# Patient Record
Sex: FEMALE | Race: BLACK OR AFRICAN AMERICAN | Employment: UNEMPLOYED | URBAN - METROPOLITAN AREA
[De-identification: names, ages, dates, MRNs, and addresses within clinical notes are randomized per-mention and may not be internally consistent; named-entity substitution may affect disease eponyms.]

---

## 2021-02-07 ENCOUNTER — APPOINTMENT (OUTPATIENT)
Dept: GENERAL RADIOLOGY | Age: 31
End: 2021-02-07
Attending: PHYSICIAN ASSISTANT
Payer: COMMERCIAL

## 2021-02-07 ENCOUNTER — APPOINTMENT (OUTPATIENT)
Dept: CT IMAGING | Age: 31
End: 2021-02-07
Attending: PHYSICIAN ASSISTANT
Payer: COMMERCIAL

## 2021-02-07 ENCOUNTER — HOSPITAL ENCOUNTER (EMERGENCY)
Age: 31
Discharge: HOME | End: 2021-02-08
Attending: EMERGENCY MEDICINE
Payer: COMMERCIAL

## 2021-02-07 VITALS
DIASTOLIC BLOOD PRESSURE: 75 MMHG | WEIGHT: 145 LBS | SYSTOLIC BLOOD PRESSURE: 119 MMHG | OXYGEN SATURATION: 100 % | TEMPERATURE: 98.6 F | HEART RATE: 81 BPM | RESPIRATION RATE: 17 BRPM

## 2021-02-07 DIAGNOSIS — R20.2 PARESTHESIA: Primary | ICD-10-CM

## 2021-02-07 LAB
ALBUMIN SERPL-MCNC: 4.1 G/DL (ref 3.4–5)
ALBUMIN/GLOB SERPL: 1.3 {RATIO} (ref 0.8–1.7)
ALP SERPL-CCNC: 95 U/L (ref 45–117)
ALT SERPL-CCNC: 34 U/L (ref 13–56)
ANION GAP SERPL CALC-SCNC: 5 MMOL/L (ref 3–18)
AST SERPL-CCNC: 18 U/L (ref 10–38)
BILIRUB SERPL-MCNC: 0.2 MG/DL (ref 0.2–1)
BUN SERPL-MCNC: 10 MG/DL (ref 7–18)
BUN/CREAT SERPL: 12 (ref 12–20)
CALCIUM SERPL-MCNC: 8.3 MG/DL (ref 8.5–10.1)
CHLORIDE SERPL-SCNC: 108 MMOL/L (ref 100–111)
CO2 SERPL-SCNC: 28 MMOL/L (ref 21–32)
CREAT SERPL-MCNC: 0.85 MG/DL (ref 0.6–1.3)
GLOBULIN SER CALC-MCNC: 3.1 G/DL (ref 2–4)
GLUCOSE SERPL-MCNC: 108 MG/DL (ref 74–99)
POTASSIUM SERPL-SCNC: 3.9 MMOL/L (ref 3.5–5.5)
PROT SERPL-MCNC: 7.2 G/DL (ref 6.4–8.2)
SODIUM SERPL-SCNC: 141 MMOL/L (ref 136–145)
TROPONIN I SERPL-MCNC: <0.02 NG/ML (ref 0–0.04)

## 2021-02-07 PROCEDURE — 81003 URINALYSIS AUTO W/O SCOPE: CPT

## 2021-02-07 PROCEDURE — 81025 URINE PREGNANCY TEST: CPT

## 2021-02-07 PROCEDURE — 70450 CT HEAD/BRAIN W/O DYE: CPT

## 2021-02-07 PROCEDURE — 85025 COMPLETE CBC W/AUTO DIFF WBC: CPT

## 2021-02-07 PROCEDURE — 99283 EMERGENCY DEPT VISIT LOW MDM: CPT

## 2021-02-07 PROCEDURE — 84484 ASSAY OF TROPONIN QUANT: CPT

## 2021-02-07 PROCEDURE — 71045 X-RAY EXAM CHEST 1 VIEW: CPT

## 2021-02-07 PROCEDURE — 80053 COMPREHEN METABOLIC PANEL: CPT

## 2021-02-08 LAB
APPEARANCE UR: CLEAR
ATRIAL RATE: 67 BPM
BASOPHILS # BLD: 0 K/UL (ref 0–0.1)
BASOPHILS NFR BLD: 0 % (ref 0–2)
BILIRUB UR QL: NEGATIVE
CALCULATED P AXIS, ECG09: 39 DEGREES
CALCULATED R AXIS, ECG10: 66 DEGREES
CALCULATED T AXIS, ECG11: 40 DEGREES
COLOR UR: YELLOW
DIAGNOSIS, 93000: NORMAL
DIFFERENTIAL METHOD BLD: ABNORMAL
EOSINOPHIL # BLD: 0.4 K/UL (ref 0–0.4)
EOSINOPHIL NFR BLD: 4 % (ref 0–5)
ERYTHROCYTE [DISTWIDTH] IN BLOOD BY AUTOMATED COUNT: 12.3 % (ref 11.6–14.5)
GLUCOSE UR STRIP.AUTO-MCNC: NEGATIVE MG/DL
HCG UR QL: NEGATIVE
HCT VFR BLD AUTO: 36.7 % (ref 35–45)
HGB BLD-MCNC: 13.1 G/DL (ref 12–16)
HGB UR QL STRIP: NEGATIVE
KETONES UR QL STRIP.AUTO: ABNORMAL MG/DL
LEUKOCYTE ESTERASE UR QL STRIP.AUTO: NEGATIVE
LYMPHOCYTES # BLD: 4.3 K/UL (ref 0.9–3.6)
LYMPHOCYTES NFR BLD: 47 % (ref 21–52)
MCH RBC QN AUTO: 31.6 PG (ref 24–34)
MCHC RBC AUTO-ENTMCNC: 35.7 G/DL (ref 31–37)
MCV RBC AUTO: 88.4 FL (ref 74–97)
MONOCYTES # BLD: 0.5 K/UL (ref 0.05–1.2)
MONOCYTES NFR BLD: 5 % (ref 3–10)
NEUTS SEG # BLD: 4.2 K/UL (ref 1.8–8)
NEUTS SEG NFR BLD: 44 % (ref 40–73)
NITRITE UR QL STRIP.AUTO: NEGATIVE
P-R INTERVAL, ECG05: 140 MS
PH UR STRIP: 6 [PH] (ref 5–8)
PLATELET # BLD AUTO: 334 K/UL (ref 135–420)
PMV BLD AUTO: 10.1 FL (ref 9.2–11.8)
PROT UR STRIP-MCNC: NEGATIVE MG/DL
Q-T INTERVAL, ECG07: 388 MS
QRS DURATION, ECG06: 68 MS
QTC CALCULATION (BEZET), ECG08: 409 MS
RBC # BLD AUTO: 4.15 M/UL (ref 4.2–5.3)
SP GR UR REFRACTOMETRY: >1.03 (ref 1–1.03)
UROBILINOGEN UR QL STRIP.AUTO: 1 EU/DL (ref 0.2–1)
VENTRICULAR RATE, ECG03: 67 BPM
WBC # BLD AUTO: 9.5 K/UL (ref 4.6–13.2)

## 2021-02-08 PROCEDURE — 93005 ELECTROCARDIOGRAM TRACING: CPT

## 2021-02-08 RX ORDER — PREDNISONE 10 MG/1
TABLET ORAL
Qty: 21 TAB | Refills: 0 | Status: SHIPPED | OUTPATIENT
Start: 2021-02-08

## 2021-02-08 NOTE — ED PROVIDER NOTES
EMERGENCY DEPARTMENT HISTORY AND PHYSICAL EXAM    Date: 2/7/2021  Patient Name: Inessa Bledsoe    History of Presenting Illness     Chief Complaint   Patient presents with    Numbness         History Provided By:patient     Chief Complaint: facial numbness and tingling  Duration: 1 week   Timing:  Acute, intermittent   Location: right side  Quality:pins and needles sensation   Severity: moderate  Modifying Factors: none  Associated Symptoms: migraine with aura      Additional History (Context): Inessa Bledsoe is a 31 y.o. female with PMH migraine headaches with aura who presents with intermittent right sided facial numbness/tingling that started 1 week ago. Pt states the tingling started at the onset of her migraine 1 week ago but reports that after her migraine resolved the tingling did not improved until 5 days later. She states the tingling stopped for 1 day then reoccurred yesterday. Pt denies facial tenderness/pain, dizziness, vision changes, or trouble with speech. She endorses nasal congestion and popping in her right ear this morning. She consulted her PCP via tele-conference this morning and he advised her to address this new symptom in ED. No other complaints reported at this time. PCP: None        Past History     Past Medical History:  No past medical history on file. Past Surgical History:  No past surgical history on file. Family History:  No family history on file. Social History:  Social History     Tobacco Use    Smoking status: Not on file   Substance Use Topics    Alcohol use: Not on file    Drug use: Not on file       Allergies: Allergies   Allergen Reactions    Penicillins Rash         Review of Systems   Review of Systems   Constitutional: Negative. Negative for chills and fever. HENT: Negative. Negative for congestion, ear pain and rhinorrhea. Eyes: Negative. Negative for pain and redness. Respiratory: Negative.   Negative for cough, shortness of breath, wheezing and stridor. Cardiovascular: Negative. Negative for chest pain and leg swelling. Gastrointestinal: Negative. Negative for abdominal pain, constipation, diarrhea, nausea and vomiting. Genitourinary: Negative. Negative for dysuria and frequency. Musculoskeletal: Negative. Negative for back pain and neck pain. Skin: Negative. Negative for rash and wound. Neurological: Positive for numbness and headaches. Negative for dizziness, seizures and syncope. Facial numbness/tingling ; hx of migraine headaches with aura   All other systems reviewed and are negative. All Other Systems Negative  Physical Exam     Vitals:    02/07/21 2235   BP: 119/75   Pulse: 81   Resp: 17   Temp: 98.6 °F (37 °C)   SpO2: 100%   Weight: 65.8 kg (145 lb)     Physical Exam  Vitals signs and nursing note reviewed. Constitutional:       General: She is not in acute distress. Appearance: She is well-developed. She is not diaphoretic. HENT:      Head: Normocephalic and atraumatic. Right Ear: Tympanic membrane, ear canal and external ear normal. There is no impacted cerumen. Left Ear: Tympanic membrane, ear canal and external ear normal. There is no impacted cerumen. Nose: No congestion or rhinorrhea. Comments: Mild erythema to the R sided nasal turbinates, otherwise HEENT exam is unremarkable. Mouth/Throat:      Mouth: Mucous membranes are moist.      Pharynx: No oropharyngeal exudate or posterior oropharyngeal erythema. Eyes:      General: No scleral icterus. Right eye: No discharge. Left eye: No discharge. Extraocular Movements: Extraocular movements intact. Conjunctiva/sclera: Conjunctivae normal.      Pupils: Pupils are equal, round, and reactive to light. Neck:      Musculoskeletal: Normal range of motion and neck supple. No neck rigidity or muscular tenderness. Cardiovascular:      Rate and Rhythm: Normal rate and regular rhythm.       Heart sounds: Normal heart sounds. No murmur. No friction rub. No gallop. Pulmonary:      Effort: Pulmonary effort is normal. No respiratory distress. Breath sounds: Normal breath sounds. No stridor. No wheezing, rhonchi or rales. Musculoskeletal: Normal range of motion. Skin:     General: Skin is warm and dry. Findings: No erythema or rash. Neurological:      Mental Status: She is alert and oriented to person, place, and time. Sensory: Sensory deficit present. Coordination: Coordination normal.      Comments: Gait is steady and patient exhibits no evidence of ataxia. Patient is able to ambulate without difficulty. No focal neurological deficit noted. No facial droop, slurred speech, or evidence of altered mentation noted on exam.  Pt endorses a slightly diminished sensation upon palpation to the R side of her face in comparison to the L. Psychiatric:         Behavior: Behavior normal.         Thought Content: Thought content normal.                Diagnostic Study Results     Labs -     Recent Results (from the past 12 hour(s))   CBC WITH AUTOMATED DIFF    Collection Time: 02/07/21 10:45 PM   Result Value Ref Range    WBC 9.5 4.6 - 13.2 K/uL    RBC 4.15 (L) 4.20 - 5.30 M/uL    HGB 13.1 12.0 - 16.0 g/dL    HCT 36.7 35.0 - 45.0 %    MCV 88.4 74.0 - 97.0 FL    MCH 31.6 24.0 - 34.0 PG    MCHC 35.7 31.0 - 37.0 g/dL    RDW 12.3 11.6 - 14.5 %    PLATELET 213 380 - 749 K/uL    MPV 10.1 9.2 - 11.8 FL    NEUTROPHILS 44 40 - 73 %    LYMPHOCYTES 47 21 - 52 %    MONOCYTES 5 3 - 10 %    EOSINOPHILS 4 0 - 5 %    BASOPHILS 0 0 - 2 %    ABS. NEUTROPHILS 4.2 1.8 - 8.0 K/UL    ABS. LYMPHOCYTES 4.3 (H) 0.9 - 3.6 K/UL    ABS. MONOCYTES 0.5 0.05 - 1.2 K/UL    ABS. EOSINOPHILS 0.4 0.0 - 0.4 K/UL    ABS.  BASOPHILS 0.0 0.0 - 0.1 K/UL    DF AUTOMATED     METABOLIC PANEL, COMPREHENSIVE    Collection Time: 02/07/21 10:45 PM   Result Value Ref Range    Sodium 141 136 - 145 mmol/L    Potassium 3.9 3.5 - 5.5 mmol/L    Chloride 108 100 - 111 mmol/L    CO2 28 21 - 32 mmol/L    Anion gap 5 3.0 - 18 mmol/L    Glucose 108 (H) 74 - 99 mg/dL    BUN 10 7.0 - 18 MG/DL    Creatinine 0.85 0.6 - 1.3 MG/DL    BUN/Creatinine ratio 12 12 - 20      GFR est AA >60 >60 ml/min/1.73m2    GFR est non-AA >60 >60 ml/min/1.73m2    Calcium 8.3 (L) 8.5 - 10.1 MG/DL    Bilirubin, total 0.2 0.2 - 1.0 MG/DL    ALT (SGPT) 34 13 - 56 U/L    AST (SGOT) 18 10 - 38 U/L    Alk. phosphatase 95 45 - 117 U/L    Protein, total 7.2 6.4 - 8.2 g/dL    Albumin 4.1 3.4 - 5.0 g/dL    Globulin 3.1 2.0 - 4.0 g/dL    A-G Ratio 1.3 0.8 - 1.7     TROPONIN I    Collection Time: 02/07/21 10:45 PM   Result Value Ref Range    Troponin-I, QT <0.02 0.0 - 0.045 NG/ML       Radiologic Studies -   CT HEAD WO CONT   Final Result      No evidence of acute intracranial abnormality. XR CHEST PORT    (Results Pending)     CT Results  (Last 48 hours)               02/07/21 2322  CT HEAD WO CONT Final result    Impression:      No evidence of acute intracranial abnormality. Narrative:  HEAD CT WITHOUT CONTRAST       HISTORY: Facial numbness on right side of face intermittently for one week,   history of migraines. COMPARISON: None. TECHNIQUE: Serial axial CT images were taken from skull vertex to foramen   magnum. Sagittal and coronal reformations were also performed. Dose reduction   techniques:  Automated exposure control, mAs and/or kVp reductions based on   patient size, and iterative reconstruction. FINDINGS:        Visualized paranasal sinuses free of significant mucosal disease. No evidence of   obvious mass or mass effect, acute intracranial hemorrhage, or identifiable   acute infarction. Normal grey and white matter differentiation. CSF spaces   normal size for age. Bones look normal without acute fracture. CXR Results  (Last 48 hours)    None            Medical Decision Making   I am the first provider for this patient.     I reviewed the vital signs, available nursing notes, past medical history, past surgical history, family history and social history. Vital Signs-Reviewed the patient's vital signs. Records Reviewed: Anaid Sebastian PA-C     Procedures:  Procedures    Provider Notes (Medical Decision Making): Impression:  Paresthesias    CT head is unremarkable for acute intracranial abnormality     Labs essentially unremarkable,   EKG normal sinus rhythm, rate 67, no STEMI or acute sT changes, review by myself and ED attending Dr. Marichuy Morrow. Chest x-ray negative for definite acute process    Clinical presentation suggestive of right-sided paresthesias likely associated with the patient's history of migraine headaches. Discharge patient with a short course of prednisone with recommendation for neurology outpatient follow-up as well as PCP follow-up. Patient states she does not have a PCP or neurologist currently as she is new to this area. Case management order was placed to assist the patient in establishing the appropriate outpatient care. Return precautions given. Patient agrees with this plan. Anaid Sebastian PA-C       MED RECONCILIATION:  No current facility-administered medications for this encounter. Current Outpatient Medications   Medication Sig    predniSONE (STERAPRED DS) 10 mg dose pack Use as directed       Disposition:  D/c    DISCHARGE NOTE:   Patient is stable for discharge at this time. I have discussed all the findings from today's work up with the patient, including lab results and imaging. I have answered all questions. Rx for prednisone given. Rest and close follow-up with the PCP recommended this week. Return to the ED immediately for any new or worsening symptoms.       Follow-up Information     Follow up With Specialties Details Why [address]  In 1 week  [address]  [facility] [address] MD Neurology In 1 week  [id number] [facility] [address] [id number] [phone]      [facility] EMERGENCY DEPT Emergency Medicine  As needed, If symptoms worsen [address] [facility] [address]  [phone]          Current Discharge Medication List      START taking these medications    Details   predniSONE (STERAPRED DS) 10 mg dose pack Use as directed  Qty: 21 Tab, Refills: 0               Diagnosis     Clinical Impression:   1.  Paresthesia

## 2021-02-08 NOTE — DISCHARGE INSTRUCTIONS
PlanStan Activation    Thank you for requesting access to PlanStan. Please follow the instructions below to securely access and download your online medical record. PlanStan allows you to send messages to your doctor, view your test results, renew your prescriptions, schedule appointments, and more. How Do I Sign Up? In your internet browser, go to [email or web address]  Click on the First Time User? Click Here link in the Sign In box. You will be redirect to the New Member Sign Up page. Enter your PlanStan Access Code exactly as it appears below. You will not need to use this code after youve completed the sign-up process. If you do not sign up before the expiration date, you must request a new code. PlanStan Access Code: [unfilled] (This is the date your PlanStan access code will )    Enter the last four digits of your Social Security Number (xxxx) and Date of Birth (mm/dd/yyyy) as indicated and click Submit. You will be taken to the next sign-up page. Create a PlanStan ID. This will be your PlanStan login ID and cannot be changed, so think of one that is secure and easy to remember. Create a PlanStan password. You can change your password at any time. Enter your Password Reset Question and Answer. This can be used at a later time if you forget your password. Enter your e-mail address. You will receive e-mail notification when new information is available in [address] Click Sign Up. You can now view and download portions of your medical record. Click the [place] link to download a portable copy of your medical information. Additional Information    If you have questions, please visit the Frequently Asked Questions section of the PlanStan website at [email or web address] Remember, PlanStan is NOT to be used for urgent needs. For medical emergencies, dial 911.

## 2021-02-08 NOTE — ED TRIAGE NOTES
Arrived from home with complaint of numbness on R side of face that has been off and on for approx. 1 week. Pt stated she has a history of migraines.

## 2021-02-17 ENCOUNTER — OFFICE VISIT (OUTPATIENT)
Dept: NEUROLOGY | Age: 31
End: 2021-02-17
Payer: COMMERCIAL

## 2021-02-17 VITALS
HEIGHT: 62 IN | SYSTOLIC BLOOD PRESSURE: 108 MMHG | RESPIRATION RATE: 16 BRPM | BODY MASS INDEX: 27.23 KG/M2 | TEMPERATURE: 97.2 F | DIASTOLIC BLOOD PRESSURE: 70 MMHG | WEIGHT: 148 LBS | HEART RATE: 77 BPM | OXYGEN SATURATION: 97 %

## 2021-02-17 DIAGNOSIS — G43.109 MIGRAINE WITH AURA AND WITHOUT STATUS MIGRAINOSUS, NOT INTRACTABLE: ICD-10-CM

## 2021-02-17 DIAGNOSIS — G43.009 ATYPICAL MIGRAINE: Primary | ICD-10-CM

## 2021-02-17 PROCEDURE — 99204 OFFICE O/P NEW MOD 45 MIN: CPT | Performed by: NURSE PRACTITIONER

## 2021-02-17 RX ORDER — IBUPROFEN 200 MG
TABLET ORAL
COMMUNITY

## 2021-02-17 RX ORDER — GABAPENTIN 100 MG/1
CAPSULE ORAL
COMMUNITY
Start: 2021-01-16

## 2021-02-17 NOTE — PROGRESS NOTES
Leah Ramirez presents today for   Chief Complaint   Patient presents with    Migraine    New Patient       Is someone accompanying this pt? no    Is the patient using any DME equipment during OV? no    Depression Screening:  No flowsheet data found. Learning Assessment:  No flowsheet data found. Abuse Screening:  No flowsheet data found. Fall Risk  No flowsheet data found. Coordination of Care:  1. Have you been to the ER, urgent care clinic since your last visit? Hospitalized since your last visit? Yes, [facility]    2. Have you seen or consulted any other health care providers outside of the [address] [place] [address] since your last visit? Include any pap smears or colon screening.  no

## 2021-02-17 NOTE — PROGRESS NOTES
[facility]  [address], [address], [place], [address]  [address] [place], [address]  Office:  [phone]  Fax: [phone]    Referring: HFU    Chief Complaint   Patient presents with    Migraine    New Patient       HPI:    This is a 31year old female seen for evaluation of migraines. Saw seen in the ER on 2/7. Earlier that week had a migraine with new feature of facial numbness and tingling. She did a virtual visit with her PCP from [facility] who evaluated her for a stroke and recommended she go to urgent care. Localized to the right cheek. Felt like her face was asleep. Did not have any facial weakness. No facial drooping or ptosis. Able to talk, chew, and swallow. No other weakness to extremities. History of migraines started in around 2009. Of recent she moved from [place], [place]. to the area. Stopped her oral birth control in December was on 30 mcg of estrogen (Kirsten Grmaricruzves). Due to her migraines her provider wanted her to switch to an IUD, but says this did not work out. For the month of February had one migraine headaches. Headaches are right sided. Localized around the eye. Throbbing pain. Lasting over 4 hours. Can last 12-36 hours. Has an aura of right eye right later field visual disturbance. Can see black spots. This does not persist. Does not have visual changes today in office. Will go away with headache. Positive light and sound sensitivity. Positive nausea with occasional vomiting. Positive right ear popping. Having some right nasal congestion. Denies lacrimation or conjunctival injection. Never had focal weakness with migraines in the past. No loss of vision in an eye that returned later, dragging an arm or leg, or waking up on the floor unsure how she got there. When she has a headache she takes ibuprofen. Denies routine use of OTC analgesics. She has tried Imitrex combination pill (Treximet) in the past but didn't tolerate this.  Was prescribed Maxalt at one time. She mentions being on gabapentin for fist and toe clenching in sleep. Said she would leave marks. On gabapentin 200 mg nightly for this. This is prescribed by her PCP. Denies biting her tongue or losing her water in the middle of the night. Denies history of seizures in self or her family. Positive family history of migraines in her mother. Denies head injury with LOC. No tobacco use. No issues with high blood pressure. Recently moved from [facility]. to be with her boyfriend who is stationed here. She is working remotely with her job in [employer] and is able to do so until 1/2022. In the ED she had an unremarkable head CT. No other concerns at this time.     Social History     Socioeconomic History    Marital status: SINGLE     Spouse name: Not on file    Number of children: Not on file    Years of education: Not on file    Highest education level: Not on file   Occupational History    Not on file   Social Needs    Financial resource strain: Not on file    Food insecurity     Worry: Not on file     Inability: Not on file    Transportation needs     Medical: Not on file     Non-medical: Not on file   Tobacco Use    Smoking status: Never Smoker    Smokeless tobacco: Never Used   Substance and Sexual Activity    Alcohol use: Not on file    Drug use: Not on file    Sexual activity: Not on file   Lifestyle    Physical activity     Days per week: Not on file     Minutes per session: Not on file    Stress: Not on file   Relationships    Social connections     Talks on phone: Not on file     Gets together: Not on file     Attends Latter-day service: Not on file     Active member of club or organization: Not on file     Attends meetings of clubs or organizations: Not on file     Relationship status: Not on file    Intimate partner violence     Fear of current or ex partner: Not on file     Emotionally abused: Not on file     Physically abused: Not on file     Forced sexual activity: Not on file   Other Topics Concern    Not on file   Social History Narrative    Not on file       Family History   Problem Relation Age of Onset    Headache Mother        Current Outpatient Medications   Medication Sig Dispense Refill    gabapentin (NEURONTIN) 100 mg capsule TAKE 2 CAPSULES BY MOUTH EVERY NIGHT AT BEDTIME      ibuprofen (MOTRIN) 200 mg tablet Take  by mouth every six (6) hours as needed for Pain.  rimegepant (NURTEC) 75 mg disintegrating tablet Take 1 Tab by mouth once as needed for Migraine for up to 1 dose. 12 Tab 2    predniSONE (STERAPRED DS) 10 mg dose pack Use as directed 21 Tab 0       No past medical history on file. No past surgical history on file. Allergies   Allergen Reactions    Penicillins Rash       There is no problem list on file for this patient. Review of Systems:   Constitutional: no fever or chills  Skin denies rash or itching  HEENT:  Positive ear popping. Denies tinnitus, hearing loss  Respiratory: denies shortness of breath  Cardiovascular: denies chest pain, dyspnea on exertion  Gastrointestinal: Positive nausea and vomiting  Genitourinary: does not report dysuria or incontinence  Musculoskeletal: does not report joint pain or swelling  Endocrine: denies weight change  Hematology: denies easy bruising or bleeding   Neurological: as above in HPI      PHYSICAL EXAMINATION:      VITAL SIGNS:    Visit Vitals  /70 (BP 1 Location: Left upper arm, BP Patient Position: Sitting, BP Cuff Size: Adult)   Pulse 77   Temp 97.2 °F (36.2 °C)   Resp 16   Ht 5' 2\" (1.575 m)   Wt 67.1 kg (148 lb)   SpO2 97%   BMI 27.07 kg/m²       GENERAL: Well developed, well nourished, in no apparent distress. HEART: RR, no murmurs heard, no carotid bruits  LUNGS:                      CTAB  EXTREMITIES: No clubbing, cyanosis, or edema is identified. Pulses 2+    and symmetrical.  HEAD:   Normocephalic, atraumatic. NEUROLOGIC EXAMINATION    MENTAL STATUS: Awake, alert, and oriented x 4. Attention and STM are grossly normal. There is no aphasia. Fund of knowledge is adequate. Mood and affect are appropriate  CRANIAL NERVES: Visual fields are full to confrontation. No fundus anomalies observed. Pupils are reactive to light and accommodation. Extraocular movements are intact and there is no nystagmus. Facial sensation is normal  Face is symmetrical.   Hearing is grossly intact. SCM/TPZ 5/5  Palate rises symmetrically. Tongue is in the midline. MOTOR:   Normal tone, bulk, and strength, 5/5 muscle strength throughout. No cogwheel rigidity or clonus present. CEREBELLAR: Finger to nose was normal.   No tremors or dysmetria    SENSORY:  Normal PP, vibration, propioception. Romberg negative    DTR's:   +2 throughout, toes downgoing     GAIT:   Normal gait      I have personally reviewed report of head CT from the ER. Impression/Plan  Brice Reyna is a 31 y.o. female whose history and physical are consistent with atypical migraine. Patient presents for new patient evaluation of headaches. History of migraine with aura. New sensory changes with headaches. Right facial numbness. No drooping, trouble talking, chewing, or swallowing. No other focal weakness. Presented initially went away then returned several days later of which she went to the ER for workup. Had negative head CT. Discussed atypical migraine. Examination is non focal.   Really only one migraine headache day in February. Discussed avoiding triptans. Counseled on use of birth control in patients with migraine. She does not have other risk factors including tobacco use, obesity, or uncontrolled blood pressure. No history of stroke or heart attack. No evidence to support avoidance of low estrogen BC formulations. Can try Nurtec ODT as needed. Discussed medication, indication, side effects, and dosing. Patient verbalized understanding. Track headaches. Follow up in 3 months or sooner as needed.   All questions addressed and patient is agreeable with plan of care. Diagnoses and all orders for this visit:    1. Atypical migraine    2. Migraine with aura and without status migrainosus, not intractable    Other orders  -     rimegepant (NURTEC) 75 mg disintegrating tablet; Take 1 Tab by mouth once as needed for Migraine for up to 1 dose. I spent 45 minutes with the patient in face-to-face consultation, with 37 minutes spent in counseling and coordination of care as described above. This note will not be viewable in [address] Signed By: Azeem Webber NP              PLEASE NOTE:   Portions of this document may have been produced using voice recognition software. Unrecognized errors in transcription may be present.

## 2021-03-05 ENCOUNTER — VIRTUAL VISIT (OUTPATIENT)
Dept: NEUROLOGY | Age: 31
End: 2021-03-05
Payer: COMMERCIAL

## 2021-03-05 DIAGNOSIS — G47.9 SLEEP DISORDER: ICD-10-CM

## 2021-03-05 DIAGNOSIS — F51.5 NIGHTMARES: ICD-10-CM

## 2021-03-05 DIAGNOSIS — G43.109 MIGRAINE WITH AURA AND WITHOUT STATUS MIGRAINOSUS, NOT INTRACTABLE: Primary | ICD-10-CM

## 2021-03-05 DIAGNOSIS — F32.A DEPRESSION, UNSPECIFIED DEPRESSION TYPE: ICD-10-CM

## 2021-03-05 PROCEDURE — 99214 OFFICE O/P EST MOD 30 MIN: CPT | Performed by: NURSE PRACTITIONER

## 2021-03-05 RX ORDER — RIMEGEPANT SULFATE 75 MG/75MG
TABLET, ORALLY DISINTEGRATING ORAL
COMMUNITY
Start: 2021-02-17 | End: 2021-09-20

## 2021-03-05 NOTE — PROGRESS NOTES
Cora Simmons presents today for   Chief Complaint   Patient presents with    Migraine     follow up       Is someone accompanying this pt? Virtual  Visit [phone]    Is the patient using any DME equipment during OV? no    Depression Screening:  No flowsheet data found. Learning Assessment:  No flowsheet data found. Abuse Screening:  No flowsheet data found. Fall Risk  No flowsheet data found. Coordination of Care:  1. Have you been to the ER, urgent care clinic since your last visit? Hospitalized since your last visit? no    2. Have you seen or consulted any other health care providers outside of the [address] [place] [address] since your last visit? Include any pap smears or colon screening.  no

## 2021-03-05 NOTE — PROGRESS NOTES
Inessa lBedsoe is a 31 y.o. female who was seen by synchronous (real-time) audio-video technology on 3/5/2021 for Migraine (follow up)    Assessment & Plan:   Diagnoses and all orders for this visit:    1. Sleep disorder  -     SLEEP MEDICINE REFERRAL  -     REFERRAL TO PSYCHIATRY    2. Nightmares  -     SLEEP MEDICINE REFERRAL  -     REFERRAL TO PSYCHIATRY    3. Depression, unspecified depression type  -     REFERRAL TO PSYCHIATRY        Patient seen for follow-up migraines. In the interim she did try Nurtec x1. Relief from headache 4-1/2 hours later. We discussed allowing adequate amount of time to trial this medication. She is agreeable. Counseled on using ibuprofen no more than twice a week. She endorses history of fist clenching in her sleep. Positive association with nightmares. Endorses recent diagnosis of depression. She is seeing a therapist for this. At this time we discussed some nonpharmacological options including wrist bracing and padding so she cannot self injure when clenching her fist in the middle the night. Can try magnesium citrate 150 mg over-the-counter to help with sleep and depression. I would like to place a referral for sleep medicine evaluation. We'll also place referral to psychiatrist as there is likely a psychological component to this behavior. She will follow-up in May regarding headaches. She would like to do another virtual follow-up. All questions addressed and patient is agreeable with plan of care. 712  Subjective: This is a 80-year-old female presenting for follow-up migraines. She tried Nurtec once in the interim without relief fast relief. She woke up. She had a nightmare that night. Was in physical pain from clenching her fists. Had a migraine. Went back to sleep. Woke up around 9 am and took Nurtec. The headache eventually subsided around 1:30. She has been clenching her first for some time. This occurs in her sleep.  Her fingernails will dig into her palms. Associated with history of nightmares. Said repeating nightmares. Endorses recent diagnosis of depression. She sees a therapist locally. Denies seeing a psychiatrist. The fist clenching was previously managed with 200 mg of gabapentin nightly as provided by her primary care provider. She has never had a sleep study. Denies biting her tongue or loss of water in the middle of the night. Prior to Admission medications    Medication Sig Start Date End Date Taking? Authorizing Provider   Nurtec ODT 75 mg disintegrating tablet TAKE 1 TABLET BY MOUTH ONCE AS NEEDED FOR MIGRAINE FOR UP TO 1 DOSE 2/17/21  Yes Provider, Historical   gabapentin (NEURONTIN) 100 mg capsule TAKE 2 CAPSULES BY MOUTH EVERY NIGHT AT BEDTIME 1/16/21  Yes Provider, Historical   ibuprofen (MOTRIN) 200 mg tablet Take  by mouth every six (6) hours as needed for Pain. Yes Provider, Historical   predniSONE (STERAPRED DS) 10 mg dose pack Use as directed 2/8/21   Anaid Sebastian PA-C ROS    Objective:     Patient-Reported Vitals 3/5/2021   Patient-Reported Weight 147lb      General: alert, cooperative, no distress   Mental  status: normal mood, behavior, speech, dress, motor activity, and thought processes, able to follow commands   HENT: NCAT   Neck: no visualized mass   Resp: no respiratory distress   Neuro: no gross deficits   Skin: no discoloration or lesions of concern on visible areas   Psychiatric: normal affect, consistent with stated mood, no evidence of hallucinations     Additional exam findings: This is a very pleasant 28-year-old female. She is alert and in no apparent distress. Full fund of knowledge. Speech is clear. No abnormal movements. No signs of incoordination or ataxia. We discussed the expected course, resolution and complications of the diagnosis(es) in detail. Medication risks, benefits, costs, interactions, and alternatives were discussed as indicated.   I advised her to contact the office if her condition worsens, changes or fails to improve as anticipated. She expressed understanding with the diagnosis(es) and plan. Jamee Halsted, was evaluated through a synchronous (real-time) audio-video encounter. The patient (or guardian if applicable) is aware that this is a billable service. Verbal consent to proceed has been obtained within the past 12 months. The visit was conducted pursuant to the emergency declaration under the [address] [place] [address] and the [employer] and [employer] General Act. Patient identification was verified, and a caregiver was present when appropriate. The patient was located in a state where the provider was credentialed to provide care.     Mishel Elizabeth NP

## 2021-04-01 ENCOUNTER — TELEPHONE (OUTPATIENT)
Dept: NEUROLOGY | Age: 31
End: 2021-04-01

## 2021-05-05 ENCOUNTER — DOCUMENTATION ONLY (OUTPATIENT)
Dept: NEUROLOGY | Age: 31
End: 2021-05-05

## 2021-09-20 ENCOUNTER — VIRTUAL VISIT (OUTPATIENT)
Dept: NEUROLOGY | Age: 31
End: 2021-09-20
Payer: COMMERCIAL

## 2021-09-20 DIAGNOSIS — G43.109 MIGRAINE WITH AURA AND WITHOUT STATUS MIGRAINOSUS, NOT INTRACTABLE: Primary | ICD-10-CM

## 2021-09-20 PROCEDURE — 99214 OFFICE O/P EST MOD 30 MIN: CPT | Performed by: NURSE PRACTITIONER

## 2021-09-20 RX ORDER — DROSPIRENONE 4 MG/1
1 TABLET, FILM COATED ORAL DAILY
COMMUNITY
Start: 2021-09-18

## 2021-09-20 RX ORDER — TRAZODONE HYDROCHLORIDE 50 MG/1
TABLET ORAL
COMMUNITY
Start: 2021-08-20

## 2021-09-20 RX ORDER — FLUOXETINE HYDROCHLORIDE 20 MG/1
CAPSULE ORAL
COMMUNITY
Start: 2021-09-06

## 2021-09-20 NOTE — PROGRESS NOTES
Calvin Bar is a 31 y.o. female who was seen by synchronous (real-time) audio-video technology on 9/20/2021 for Migraine        Assessment & Plan:   Diagnoses and all orders for this visit:    1. Migraine with aura and without status migrainosus, not intractable    Other orders  -     ubrogepant (UBRELVY) 100 mg tablet; Take one tablet at headache onset. Max one tab in 24 hours. Patient presents for follow-up migraines. In the interim has not found resolve from Nurtec for abortive headache therapy. Overall endorses improvement in the amount of headaches. Since she has established with a psychiatrist and been placed on 2 new medications. Feels her depression is well controlled. Does not report SI/HI. She did see a sleep medicine provider in the meantime and had an inconclusive at home sleep study. It was recommended she have an attended sleep study but she has not followed through with this due to insurance purposes. She will maintain follow-ups with sleep medicine provider and determine how next steps can be completed for sleep study. She tells me she continues to do fist clenching if she skips her gabapentin. She tells me she may be relocating in December to [place]. She will follow-up with me in November. No other concerns at this time. I spent at least 30 minutes on this visit with this established patient. 712  Subjective: This is a 31year old female who presents for follow up headaches. Last seen in March. In the interim endorses headaches have been occurring less. Doesn't feel the Nurtec has been working. She said last Sunday was her first bad migraine in a while. Left-sided pain. Felt pressure pain in her left ear. Associated with neck pain. Denies focal deficits. She decided to go to the chiropractor on Tuesday and had some resolve from headache after this. Headache finally went away on Thursday. She tried Nurtec without resolve.   Denies routine use of over-the-counter analgesic. In the past she did not tolerate triptans and said they made her feel sick. She has established with a psychiatrist and is on 2 new medications. She feels her depression is much better. Does not report SI/HI. She did see a sleep medicine provider in the interim and had an at-home sleep study that was inconclusive, but due to insurance reasons has not been able to have the attended sleep study. Continues to take gabapentin for fist clenching. Denies waking up on the floor unsure how she got there, biting her tongue, or losing her water in the middle the night. Denies any other concerns at this time. Prior to Admission medications    Medication Sig Start Date End Date Taking? Authorizing Provider   traZODone (DESYREL) 50 mg tablet TAKE 1 TABLET BY MOUTH EVERYDAY AT BEDTIME. *MAX 30 DAYS (INSURANCE PAYS ONLY) 8/20/21  Yes Provider, Historical   FLUoxetine (PROzac) 20 mg capsule TAKE ONE CAPSULE BY MOUTH IN THE MORNING 9/6/21  Yes Provider, Historical   Slynd 4 mg (28) tab Take 1 Tablet by mouth daily. 9/18/21  Yes Provider, Historical   ubrogepant (UBRELVY) 100 mg tablet Take one tablet at headache onset. Max one tab in 24 hours. 9/20/21  Yes Cassandra Coon, NP   gabapentin (NEURONTIN) 100 mg capsule TAKE 2 CAPSULES BY MOUTH EVERY NIGHT AT BEDTIME 1/16/21  Yes Provider, Historical   ibuprofen (MOTRIN) 200 mg tablet Take  by mouth every six (6) hours as needed for Pain. Yes Provider, Historical   Nurtec ODT 75 mg disintegrating tablet TAKE 1 TABLET BY MOUTH ONCE AS NEEDED FOR MIGRAINE FOR UP TO 1 DOSE 2/17/21 9/20/21  Provider, Historical   predniSONE (STERAPRED DS) 10 mg dose pack Use as directed 2/8/21   Anaid Sebastian PA-C     There is no problem list on file for this patient. There are no problems to display for this patient. Current Outpatient Medications   Medication Sig Dispense Refill    traZODone (DESYREL) 50 mg tablet TAKE 1 TABLET BY MOUTH EVERYDAY AT BEDTIME.  *MAX 30 DAYS (INSURANCE PAYS ONLY)      FLUoxetine (PROzac) 20 mg capsule TAKE ONE CAPSULE BY MOUTH IN THE MORNING      Slynd 4 mg (28) tab Take 1 Tablet by mouth daily.  ubrogepant (UBRELVY) 100 mg tablet Take one tablet at headache onset. Max one tab in 24 hours. 10 Tablet 5    gabapentin (NEURONTIN) 100 mg capsule TAKE 2 CAPSULES BY MOUTH EVERY NIGHT AT BEDTIME      ibuprofen (MOTRIN) 200 mg tablet Take  by mouth every six (6) hours as needed for Pain.  predniSONE (STERAPRED DS) 10 mg dose pack Use as directed 21 Tab 0     Allergies   Allergen Reactions    Penicillins Rash     No past medical history on file. No past surgical history on file. Family History   Problem Relation Age of Onset    Headache Mother      Social History     Tobacco Use    Smoking status: Never Smoker    Smokeless tobacco: Never Used   Substance Use Topics    Alcohol use: Not on file       Review of Systems  GENERAL: Denies fever or fatigue  CARDIAC: No CP or SOB  PULMONARY: No cough of SOB  MUSCULOSKELETAL: No new joint pain  NEURO: SEE HPI      Objective:     Patient-Reported Vitals 9/20/2021   Patient-Reported Weight 151lb   Patient-Reported LMP May 2021      General: alert, cooperative, no distress   Mental  status: normal mood, behavior, speech, dress, motor activity, and thought processes, able to follow commands   HENT: NCAT   Neck: no visualized mass   Resp: no respiratory distress   Neuro: no gross deficits   Skin: no discoloration or lesions of concern on visible areas   Psychiatric: normal affect, consistent with stated mood, no evidence of hallucinations     Additional exam findings: This is a very pleasant 51-year-old female. She is alert in no apparent distress. Full fund of knowledge. Speech is clear. No facial asymmetry. No signs of ataxia or incoordination. We discussed the expected course, resolution and complications of the diagnosis(es) in detail.   Medication risks, benefits, costs, interactions, and alternatives were discussed as indicated. I advised her to contact the office if her condition worsens, changes or fails to improve as anticipated. She expressed understanding with the diagnosis(es) and plan. Zack Fischer, was evaluated through a synchronous (real-time) audio-video encounter. The patient (or guardian if applicable) is aware that this is a billable service. Verbal consent to proceed has been obtained within the past 12 months. The visit was conducted pursuant to the emergency declaration under the [address] [place] [address] authority and the [employer] and [employer] General Act. Patient identification was verified, and a caregiver was present when appropriate. The patient was located in a state where the provider was credentialed to provide care.     Elliot Alcazar NP

## 2021-09-20 NOTE — PROGRESS NOTES
Thomas Anderson is a 31 y.o. female on virtual visit today for follow-up on migraines. 1. Have you been to the ER, urgent care clinic since your last visit? Hospitalized since your last visit? Yes Reason for visit: [facility], August 2021, sinus infection    2. Have you seen or consulted any other health care providers outside of the [address] [place] [address] since your last visit? Include any pap smears or colon screening. No    Mobile number [phone] will be used for today's visit.